# Patient Record
Sex: MALE | Race: WHITE | HISPANIC OR LATINO | Employment: OTHER | URBAN - METROPOLITAN AREA
[De-identification: names, ages, dates, MRNs, and addresses within clinical notes are randomized per-mention and may not be internally consistent; named-entity substitution may affect disease eponyms.]

---

## 2017-03-07 ENCOUNTER — HISTORICAL (OUTPATIENT)
Dept: RADIOLOGY | Facility: HOSPITAL | Age: 72
End: 2017-03-07

## 2017-10-18 ENCOUNTER — HISTORICAL (OUTPATIENT)
Dept: LAB | Facility: HOSPITAL | Age: 72
End: 2017-10-18

## 2018-06-11 ENCOUNTER — HISTORICAL (OUTPATIENT)
Dept: ADMINISTRATIVE | Facility: HOSPITAL | Age: 73
End: 2018-06-11

## 2018-06-13 LAB
FINAL CULTURE: NORMAL
RAPID GROUP A STREP (OHS): NEGATIVE

## 2019-04-02 ENCOUNTER — HISTORICAL (OUTPATIENT)
Dept: LAB | Facility: HOSPITAL | Age: 74
End: 2019-04-02

## 2019-04-02 LAB — DEPRECATED CALCIDIOL+CALCIFEROL SERPL-MC: 37.41 NG/ML (ref 30–80)

## 2020-12-14 ENCOUNTER — HISTORICAL (OUTPATIENT)
Dept: ADMINISTRATIVE | Facility: HOSPITAL | Age: 75
End: 2020-12-14

## 2022-04-09 ENCOUNTER — HISTORICAL (OUTPATIENT)
Dept: ADMINISTRATIVE | Facility: HOSPITAL | Age: 77
End: 2022-04-09

## 2022-04-26 VITALS
DIASTOLIC BLOOD PRESSURE: 83 MMHG | HEIGHT: 68 IN | WEIGHT: 153 LBS | SYSTOLIC BLOOD PRESSURE: 134 MMHG | BODY MASS INDEX: 23.19 KG/M2

## 2022-12-22 DIAGNOSIS — M48.02 CERVICAL STENOSIS OF SPINE: Primary | ICD-10-CM

## 2023-01-03 ENCOUNTER — TELEPHONE (OUTPATIENT)
Dept: NEUROSURGERY | Facility: CLINIC | Age: 78
End: 2023-01-03
Payer: MEDICARE

## 2023-01-03 NOTE — TELEPHONE ENCOUNTER
----- Message from Joycelyn Willard MA sent at 12/30/2022 12:13 PM CST -----  Regarding: call Emma Preciado NP  Call 's office and see if they have a recent MRI

## 2023-01-03 NOTE — TELEPHONE ENCOUNTER
Tried to call referring doctors office for recent MRI. I had to leave a message for Emma rascon. I did leave her our fax number to fax over to proceed with referral

## 2023-01-11 ENCOUNTER — TELEPHONE (OUTPATIENT)
Dept: NEUROSURGERY | Facility: CLINIC | Age: 78
End: 2023-01-11
Payer: MEDICARE

## 2023-01-11 DIAGNOSIS — M48.02 CERVICAL STENOSIS OF SPINE: Primary | ICD-10-CM

## 2023-01-13 ENCOUNTER — TELEPHONE (OUTPATIENT)
Dept: NEUROSURGERY | Facility: CLINIC | Age: 78
End: 2023-01-13
Payer: MEDICARE

## 2023-01-17 ENCOUNTER — TELEPHONE (OUTPATIENT)
Dept: NEUROSURGERY | Facility: CLINIC | Age: 78
End: 2023-01-17
Payer: MEDICARE

## 2023-01-17 NOTE — TELEPHONE ENCOUNTER
Called patient and scheduled with Clare on 2/13/23 at 11:00. Xray is scheduled at [facility] for 2/13/23 at 10:00. Patient is aware of this and verbalized understanding. Patient has not seen any other doctors for this reason except for Dr. Florez (office note in chart). He has had injections with Dr. Florez (also in chart). Has not had any recent testing besides MRI. States he has had neck sx with Dr. Stevens in 2015. I emailed him the consent form so I can get those records prior to apt.

## 2023-03-21 ENCOUNTER — OFFICE VISIT (OUTPATIENT)
Dept: NEUROSURGERY | Facility: CLINIC | Age: 78
End: 2023-03-21
Payer: MEDICARE

## 2023-03-21 VITALS
RESPIRATION RATE: 18 BRPM | HEART RATE: 55 BPM | HEIGHT: 68 IN | DIASTOLIC BLOOD PRESSURE: 78 MMHG | WEIGHT: 151 LBS | BODY MASS INDEX: 22.88 KG/M2 | SYSTOLIC BLOOD PRESSURE: 125 MMHG

## 2023-03-21 DIAGNOSIS — M54.9 DORSALGIA, UNSPECIFIED: ICD-10-CM

## 2023-03-21 DIAGNOSIS — M54.41 BILATERAL LOW BACK PAIN WITH RIGHT-SIDED SCIATICA, UNSPECIFIED CHRONICITY: Primary | ICD-10-CM

## 2023-03-21 DIAGNOSIS — M48.02 CERVICAL STENOSIS OF SPINE: ICD-10-CM

## 2023-03-21 PROCEDURE — 99214 PR OFFICE/OUTPT VISIT, EST, LEVL IV, 30-39 MIN: ICD-10-PCS | Mod: ,,, | Performed by: PHYSICIAN ASSISTANT

## 2023-03-21 PROCEDURE — 99214 OFFICE O/P EST MOD 30 MIN: CPT | Mod: ,,, | Performed by: PHYSICIAN ASSISTANT

## 2023-03-21 RX ORDER — LATANOPROST 50 UG/ML
SOLUTION/ DROPS OPHTHALMIC DAILY
COMMUNITY

## 2023-03-21 RX ORDER — GABAPENTIN 300 MG/1
300 CAPSULE ORAL 2 TIMES DAILY
COMMUNITY
Start: 2023-02-27 | End: 2023-06-07

## 2023-03-21 RX ORDER — VIT C/E/ZN/COPPR/LUTEIN/ZEAXAN 250MG-90MG
CAPSULE ORAL DAILY
COMMUNITY

## 2023-03-21 NOTE — PROGRESS NOTES
[facility]  History & Physical  Neurosurgery      Uche Nowak Jr   [id number]   1945       CHIEF COMPLAINT:  Lower thoracic and lower back pain.    HPI:  Uche Nowak Jr is a 77 y.o. male who presents for neurosurgical evaluation.  The patient complains of lower thoracic pain as well as lumbar pain.  He has pain that also radiates into the right buttock.  He has a long history of lower back pain.  The pain is worse when he is lying.  The pain interrupts his sleep.  Upon waking, it takes about 5 minutes to be able to straighten up due to the lower back pain.  Once he is up and about, he does not have pain.  In addition, prolonged standing causes an increase in lower back pain.  Denies numbness or tingling in either lower extremity.  Subjectively, he does not feel as though he has weakness in either lower extremity.  The back pain is not stopping him from doing anything he desires.    He has been seen in the past by Dr. Donaldson and myself for cervical stenosis.  He was last seen on 03/31/2022.  Although he has significant stenosis at C3-4 and C4-5, he did not have signs or symptoms of myelopathy.  He did have neck pain with pain radiating into the left trapezius muscle.  That pain has resolved since starting gabapentin.  This was prescribed by Dr. Denice Marrufo.  He does not have difficulty with dexterity.  He does feel the left arm is weaker than the right lifting weights overhead.  He does not have difficulties with his balance.  He was able to go skiing last week.  He denies disturbances in bowel or bladder function.      Past Medical History:   Diagnosis Date    Ankylosing spondylitis of unspecified sites in spine     Prostate cancer     Psoriasis     Rheumatoid arthritis, unspecified     Spinal stenosis of cervical region        Past Surgical History:   Procedure Laterality Date    CATARACT EXTRACTION Right 12/14/2020    Right C4-5, left C6-7 foraminotomies  07/15/2014    Dr. Orozco  "   ROTATOR CUFF REPAIR      VASECTOMY  02/02/1991       Family History   Problem Relation Age of Onset    Cancer Mother     Aortic aneurysm Father     Heart disease Brother        Social History     Socioeconomic History    Marital status: Unknown   Tobacco Use    Smoking status: Never    Smokeless tobacco: Never   Substance and Sexual Activity    Alcohol use: Yes     Comment: occaional    Drug use: Never        Review of patient's allergies indicates:   Allergen Reactions    Codeine Rash and Swelling    Pseudoephedrine Rash and Swelling    Acitretin Itching     Other reaction(s): arthralgia, Arthralgia (Joint Pain), hair loss    Cortisone         Medication List with Changes/Refills   Current Medications    CALCIUM CARBONATE (CALCIUM 500 ORAL)    Take by mouth once daily.    CHOLECALCIFEROL, VITAMIN D3, (VITAMIN D3) 25 MCG (1,000 UNIT) CAPSULE    once daily.    GABAPENTIN (NEURONTIN) 300 MG CAPSULE    Take 300 mg by mouth 2 (two) times daily.    LATANOPROST 0.005 % OPHTHALMIC SOLUTION    once daily.        Review of Systems   Constitutional:  Negative for chills and fever.   HENT:  Negative for congestion and hearing loss.    Eyes:  Negative for blurred vision and double vision.   Respiratory:  Negative for cough and wheezing.    Cardiovascular:  Negative for chest pain and palpitations.   Gastrointestinal:  Negative for nausea and vomiting.   Genitourinary:  Negative for urgency.   Musculoskeletal:  Positive for back pain. Negative for falls.   Skin:  Negative for rash.   Neurological:  Positive for weakness. Negative for dizziness, tingling and headaches.   Psychiatric/Behavioral:  Negative for hallucinations.         Physical Examination:    Vital Signs:  /78 (BP Location: Other (Comment), Patient Position: Sitting)   Pulse (!) 55   Resp 18   Ht 5' 8" (1.727 m)   Wt 68.5 kg (151 lb)   BMI 22.96 kg/m²        General:  Pleasant. Well-nourished. Well-groomed.    Lungs:  Breathing is quiet, " nonlabored.    Musculoskeletal:  Cervical ROM: Mildly limited with extension, moderately limited with bilateral rotation.  Lumbar ROM:  Severely limited with extension mildly limited with flexion  Hip rotation is negative bilaterally.  Straight leg raise is negative bilaterally.    Neurological:    Oriented to Person, Place, Time   Muscle strength against resistance:  Strength  Deltoids Triceps Biceps Wrist Extension Wrist Flexion Intrinsics   Upper: R 5/5 5/5 5/5 5/5 5/5 5/5    L 5/5 5/5 5/5 5/5 5/5 5/5     Iliopsoas Quadriceps Knee  Flexion Tibialis  anterior Gastro- cnemius EHL   Lower: R 5/5 5/5 5/5 5/5 5/5 5/5    L 5/5 5/5 5/5 5/5 5/5 5/5   Sensation is intact to primary modalities in bilateral upper and lower extremities with the exception of being diminished along bilateral median and S1 distributions.  Reflexes:   Right Left   Triceps 2+ 2+   Biceps 0 0   Brachioradialis 2+ 2+   Patellar 3+ 3+   Achilles 2+ 2+   Horn's sign is negative bilaterally.  Toes are down going to Babinski.  There is no clonus at the ankles.  Gait: normal  He is able to walk on heels and toes without difficulty.  He has a little difficulty walking in tandem.      Imaging:  Cervical x-rays were obtained on 02/13/2023.  This study shows disc space narrowing at C3-4 and C6-7.  Alignment is normal.  There is no abnormal motion with flexion or extension.  MRI of the cervical spine was obtained on 01/07/2023.  At C3-4 and C4-5, there is disc/osteophyte complex along with uncinate and facet hypertrophy.  This causes severe central and bilateral foraminal stenosis at C3-4.  There is severe central and right foraminal with moderate left foraminal stenosis at C4-5.  At C5-6, there is mild central moderate left foraminal and severe right foraminal stenosis.  At C6-7, there is severe bilateral foraminal stenosis.  The stenosis at C3-4 and C4-5 has progressed compared to prior MRI from 07/17/2021.      ASSESSMENT/PLAN:     1. Bilateral low  back pain with right-sided sciatica, unspecified chronicity  X-Ray Lumbar Complete Including Flex And Ext    X-Ray Thoracic Spine AP Lateral      2. Dorsalgia, unspecified  MRI Lumbar Spine Without Contrast      3. Cervical stenosis of spine          It is possible that the lower back pain could be coming from the cervical stenosis.  However, we need to obtain thoracic and lumbar imaging.  He will return for follow-up with those studies.       A total of 27 minutes was spent face-to-face with the patient during this encounter.  Over half of that time was spent on counseling and coordination of care.  Additional time was used to reviewed the patient's chart including prior office notes, cervical MRI and x-ray imaging, compare with old cervical MRI, and work on office note.      Clare Meraz PA-C

## 2023-04-05 ENCOUNTER — OFFICE VISIT (OUTPATIENT)
Dept: NEUROSURGERY | Facility: CLINIC | Age: 78
End: 2023-04-05
Payer: MEDICARE

## 2023-04-05 VITALS
HEIGHT: 68 IN | BODY MASS INDEX: 22.88 KG/M2 | WEIGHT: 151 LBS | HEART RATE: 59 BPM | DIASTOLIC BLOOD PRESSURE: 73 MMHG | SYSTOLIC BLOOD PRESSURE: 122 MMHG | RESPIRATION RATE: 17 BRPM

## 2023-04-05 DIAGNOSIS — M54.41 BILATERAL LOW BACK PAIN WITH RIGHT-SIDED SCIATICA, UNSPECIFIED CHRONICITY: ICD-10-CM

## 2023-04-05 DIAGNOSIS — M51.36 LUMBAR DEGENERATIVE DISC DISEASE: Primary | ICD-10-CM

## 2023-04-05 PROCEDURE — 99215 PR OFFICE/OUTPT VISIT, EST, LEVL V, 40-54 MIN: ICD-10-PCS | Mod: ,,, | Performed by: PHYSICIAN ASSISTANT

## 2023-04-05 PROCEDURE — 99215 OFFICE O/P EST HI 40 MIN: CPT | Mod: ,,, | Performed by: PHYSICIAN ASSISTANT

## 2023-04-05 RX ORDER — CYCLOBENZAPRINE HCL 10 MG
10 TABLET ORAL 3 TIMES DAILY PRN
Qty: 30 TABLET | Refills: 0 | Status: SHIPPED | OUTPATIENT
Start: 2023-04-05 | End: 2023-04-15

## 2023-04-05 RX ORDER — CELECOXIB 100 MG/1
100 CAPSULE ORAL 2 TIMES DAILY PRN
COMMUNITY
Start: 2023-03-28 | End: 2023-06-07

## 2023-04-05 NOTE — PROGRESS NOTES
[facility]  History & Physical  Neurosurgery      Uche Nowak Jr   [id number]   1945       CHIEF COMPLAINT:  Lower back and right buttock    HPI:  Uche Nowak Jr is a 77 y.o. male who presents for follow up appointment with thoracic and lumbar x-rays and lumbar MRI.  He complains of low back pain with pain radiating to the right buttocks.  He was last seen on 03/21/2023.  Since that time, there has been an increase in pain in his right buttock.  A couple of weeks ago he was working on a fence.  He bent over and felt an exacerbation of pain into the right buttock.  The pain has been persistent.  He rates his pain at 4/10 at this time.      Past Medical History:   Diagnosis Date    Ankylosing spondylitis of unspecified sites in spine     Prostate cancer     Psoriasis     Rheumatoid arthritis, unspecified     Spinal stenosis of cervical region        Past Surgical History:   Procedure Laterality Date    CATARACT EXTRACTION Right 12/14/2020    Right C4-5, left C6-7 foraminotomies  07/15/2014    Dr. Orozco    ROTATOR CUFF REPAIR      VASECTOMY  02/02/1991       Family History   Problem Relation Age of Onset    Cancer Mother     Aortic aneurysm Father     Heart disease Brother        Social History     Socioeconomic History    Marital status: Unknown   Tobacco Use    Smoking status: Never    Smokeless tobacco: Never   Substance and Sexual Activity    Alcohol use: Yes     Comment: occaional    Drug use: Never       Current Outpatient Medications   Medication Sig Dispense Refill    calcium carbonate (CALCIUM 500 ORAL) Take by mouth once daily.      celecoxib (CELEBREX) 100 MG capsule Take 100 mg by mouth 2 (two) times daily as needed.      cholecalciferol, vitamin D3, (VITAMIN D3) 25 mcg (1,000 unit) capsule once daily.      gabapentin (NEURONTIN) 300 MG capsule Take 300 mg by mouth 2 (two) times daily.      latanoprost 0.005 % ophthalmic solution once daily.       No current  "facility-administered medications for this visit.       Review of patient's allergies indicates:   Allergen Reactions    Codeine Rash and Swelling    Pseudoephedrine Rash and Swelling    Acitretin Itching     Other reaction(s): arthralgia, Arthralgia (Joint Pain), hair loss    Cortisone         Medication List with Changes/Refills   Current Medications    CALCIUM CARBONATE (CALCIUM 500 ORAL)    Take by mouth once daily.    CELECOXIB (CELEBREX) 100 MG CAPSULE    Take 100 mg by mouth 2 (two) times daily as needed.    CHOLECALCIFEROL, VITAMIN D3, (VITAMIN D3) 25 MCG (1,000 UNIT) CAPSULE    once daily.    GABAPENTIN (NEURONTIN) 300 MG CAPSULE    Take 300 mg by mouth 2 (two) times daily.    LATANOPROST 0.005 % OPHTHALMIC SOLUTION    once daily.           ROS:    Review of Systems   Constitutional:  Negative for chills and fever.   HENT:  Negative for nosebleeds and sore throat.    Eyes:  Negative for pain and visual disturbance.   Respiratory:  Negative for cough, chest tightness and shortness of breath.    Cardiovascular:  Negative for chest pain.   Gastrointestinal:  Negative for diarrhea, nausea and vomiting.   Genitourinary:  Negative for difficulty urinating, dysuria and hematuria.   Musculoskeletal:  Positive for back pain and neck pain. Negative for gait problem and myalgias.   Skin:  Negative for rash.   Neurological:  Negative for dizziness, facial asymmetry, numbness and headaches.   Psychiatric/Behavioral:  Negative for confusion and sleep disturbance. The patient is not nervous/anxious.      Physical Examination:    Vital Signs:  /73 (BP Location: Other (Comment), Patient Position: Sitting)   Pulse (!) 59   Resp 17   Ht 5' 8" (1.727 m)   Wt 68.5 kg (151 lb)   BMI 22.96 kg/m²      General:  Pleasant. Well-nourished. Well-groomed.     Lungs:  Breathing is quiet, nonlabored.     Musculoskeletal:  Cervical ROM: Mildly limited with extension, moderately limited with bilateral rotation.  Lumbar ROM:  " Severely limited with extension mildly limited with flexion  Hip rotation is negative bilaterally.  Straight leg raise is negative bilaterally.     Neurological:    Oriented to Person, Place, Time   Muscle strength against resistance:  Strength   Deltoids Triceps Biceps Wrist Extension Wrist Flexion Intrinsics   Upper: R 5/5 5/5 5/5 5/5 5/5 5/5     L 5/5 5/5 5/5 5/5 5/5 5/5       Iliopsoas Quadriceps Knee  Flexion Tibialis  anterior Gastro- cnemius EHL   Lower: R 5/5 5/5 5/5 5/5 5/5 5/5     L 5/5 5/5 5/5 5/5 5/5 5/5   Sensation is intact to primary modalities in bilateral upper and lower extremities with the exception of being diminished along bilateral median and S1 distributions.  Reflexes:    Right Left   Triceps 2+ 2+   Biceps 0 0   Brachioradialis 2+ 2+   Patellar 3+ 3+   Achilles 2+ 2+   Horn's sign is negative bilaterally.  Toes are down going to Babinski.  There is no clonus at the ankles.  Gait: normal  He is able to walk on heels and toes without difficulty.  He has a little difficulty walking in tandem.      Imaging:  All pertinent neuroimaging independently reviewed. Discussed these findings in detail with the patient.    ASSESSMENT/PLAN:     1. Lumbar degenerative disc disease  Ambulatory referral/consult to Physical/Occupational Therapy    cyclobenzaprine (FLEXERIL) 10 MG tablet      2. Bilateral low back pain with right-sided sciatica, unspecified chronicity          Options were discussed at length with the patient.  He will undergo a course of physical therapy.  He was also provided with a prescription for Flexeril.  He will return for follow-up in 8 weeks.      A total of 18 minutes was spent face-to-face with the patient during this encounter.  Over half of that time was spent on counseling and coordination of care.  An additional 25 minutes were used to review the patients chart, thoracic and lumbar x-rays, lumbar MRI, and work on office note.      Clare Meraz PA-C

## 2023-06-07 ENCOUNTER — OFFICE VISIT (OUTPATIENT)
Dept: NEUROSURGERY | Facility: CLINIC | Age: 78
End: 2023-06-07
Payer: MEDICARE

## 2023-06-07 VITALS
BODY MASS INDEX: 22.22 KG/M2 | DIASTOLIC BLOOD PRESSURE: 79 MMHG | HEIGHT: 68 IN | SYSTOLIC BLOOD PRESSURE: 126 MMHG | RESPIRATION RATE: 16 BRPM | WEIGHT: 146.63 LBS | HEART RATE: 53 BPM

## 2023-06-07 DIAGNOSIS — M51.36 LUMBAR DEGENERATIVE DISC DISEASE: Primary | ICD-10-CM

## 2023-06-07 DIAGNOSIS — M51.34 DDD (DEGENERATIVE DISC DISEASE), THORACIC: ICD-10-CM

## 2023-06-07 PROCEDURE — 99213 OFFICE O/P EST LOW 20 MIN: CPT | Mod: ,,, | Performed by: PHYSICIAN ASSISTANT

## 2023-06-07 PROCEDURE — 99213 PR OFFICE/OUTPT VISIT, EST, LEVL III, 20-29 MIN: ICD-10-PCS | Mod: ,,, | Performed by: PHYSICIAN ASSISTANT

## 2023-06-07 RX ORDER — CYCLOBENZAPRINE HCL 10 MG
TABLET ORAL
COMMUNITY
End: 2023-06-07

## 2023-06-07 NOTE — PROGRESS NOTES
[facility]  History & Physical  Neurosurgery      Uche Nowak Jr   [id number]   1945       SUBJECTIVE:     CHIEF COMPLAINT:  Mid and lower back pain      HPI:  Uche Nowak Jr is a 77 y.o. male who presents for follow up appointment after a course of physical therapy.  He complains of intermittent mid back and lower back pain.  The mid back pain comes on mid day, depending on his activity.  It is especially increased with bending over as when he works in his yard.  He awakens with lower back pain.  It takes him several minutes before he can straighten due to the low back pain.  For both, some days are better than others.  He is able to participate in all activities he desires.  He has continue with his exercise regimen, 5 days per week.  He previously had neck pain with pain radiating to the left upper extremity.  That began 2 years ago.  He was placed on gabapentin by Dr. Eduardo.  That pain has resolved months ago.  He has been able to taper off of the gabapentin without recurrence of pain.      Past Medical History:   Diagnosis Date    Ankylosing spondylitis of unspecified sites in spine     Prostate cancer     Psoriasis     Rheumatoid arthritis, unspecified     Spinal stenosis of cervical region        Past Surgical History:   Procedure Laterality Date    CATARACT EXTRACTION Right 12/14/2020    Right C4-5, left C6-7 foraminotomies  07/15/2014    Dr. Orozco    ROTATOR CUFF REPAIR      VASECTOMY  02/02/1991       Family History   Problem Relation Age of Onset    Cancer Mother     Aortic aneurysm Father     Heart disease Brother        Social History     Socioeconomic History    Marital status: Unknown   Tobacco Use    Smoking status: Never    Smokeless tobacco: Never   Substance and Sexual Activity    Alcohol use: Yes     Comment: occaional    Drug use: Never       Review of patient's allergies indicates:   Allergen Reactions    Codeine Rash and Swelling    Pseudoephedrine Rash and  "Swelling    Acitretin Itching     Other reaction(s): arthralgia, Arthralgia (Joint Pain), hair loss        Current Outpatient Medications   Medication Instructions    calcium carbonate (CALCIUM 500 ORAL) Oral, Daily    cholecalciferol, vitamin D3, (VITAMIN D3) 25 mcg (1,000 unit) capsule Daily    latanoprost 0.005 % ophthalmic solution Daily           ROS:    Review of Systems   Constitutional:  Negative for chills and fever.   HENT:  Negative for nosebleeds and sore throat.    Eyes:  Negative for pain and visual disturbance.   Respiratory:  Negative for cough, chest tightness and shortness of breath.    Cardiovascular:  Negative for chest pain.   Gastrointestinal:  Negative for diarrhea, nausea and vomiting.   Genitourinary:  Negative for difficulty urinating, dysuria and hematuria.   Musculoskeletal:  Positive for back pain. Negative for gait problem and myalgias.   Skin:  Negative for rash.   Neurological:  Negative for dizziness, facial asymmetry, weakness and headaches.   Psychiatric/Behavioral:  Negative for confusion and sleep disturbance. The patient is not nervous/anxious.        OBJECTIVE:     Physical Examination:      Visit Vitals  /79   Pulse (!) 53   Resp 16   Ht 5' 8" (1.727 m)   Wt 66.5 kg (146 lb 9.6 oz)   BMI 22.29 kg/m²        General:  Pleasant, Well-nourished, Well-groomed.    Lungs:  Breathing is quiet with non-labored respirations.    Neurological:  The patient is awake, alert, and oriented in all 4 spheres.  His memory, cognition, and affect are appropriate.  Speech is fluent.  He is moving all extremities well.  Gait is normal.  Coordination:  Within normal limits    Imaging:  All pertinent neuroimaging independently reviewed. Discussed these findings in detail with the patient.    ASSESSMENT:     1. Lumbar degenerative disc disease        2. DDD (degenerative disc disease), thoracic           PLAN:     Options were discussed with the patient.  We discussed the importance of proper " body mechanics as well as core strengthening exercises.  He voiced understanding.  He was provided with a handout regarding stretching and strengthening exercises for the mid and lower back.  He will incorporate this into his exercise program.  He will return for follow-up on an as-needed basis.      A total of 10 minutes was spent face-to-face with the patient during this encounter.  Over half of that time was spent on counseling and coordination of care.  An additional 10+ minutes were used to reviewed the patient's chart, lumbar MRI images, lumbar and thoracic x-rays images, and work on office note.      Clare Meraz PA-C

## 2023-12-11 ENCOUNTER — TELEPHONE (OUTPATIENT)
Dept: NEUROSURGERY | Facility: CLINIC | Age: 78
End: 2023-12-11
Payer: MEDICARE

## 2023-12-12 NOTE — TELEPHONE ENCOUNTER
Please schedule the patient sooner rather than later on either my or Clare's schedule with AP/Lat/Flex/Ext views of the lumbar spine. Thanks

## 2023-12-13 NOTE — TELEPHONE ENCOUNTER
Spoke with patient and scheduled him with Kellen for 12/21 at 2:00 w/ XR prior at [facility] for 1:00pm.

## 2023-12-19 NOTE — PROGRESS NOTES
[facility]  History & Physical  Neurosurgery      Uche Nowak Jr   [id number]   1945       SUBJECTIVE:     CHIEF COMPLAINT:    Lower back pain    HPI:  Uche Nowak Jr is a 78 y.o. male who presents for neurosurgical evaluation. The patient was last seen in clinic by Clare on 6/7/2023 describing mid and lower back pain that was intermittently present.  He reported his symptoms were made worse with bending forward such as with yard work.  He  reported he would often wake with lower back pain although this would improve once he was up and moving.  He was exercising 5 days a week at that time.  He also previously had neck pain radiating into the left upper extremity.  He was provided gabapentin per Dr. Eduardo although was no longer requiring this medication at that time secondary to improvement of symptoms.    The patient notified our office on 12/12/2023 of a fall on 12/1/23 which resulted in some shoulder pain as well as severe lower back pain. He was seen at [facility] on 12/11/23 and provided a right shoulder injection.  He reports his lower back pain and shoulder pain have now resolved.  He states his  mid back pain  has returned to baseline.  He states he does continue to have intermittent axial mid back pain throughout the day which does respond to Advil.  He states his symptoms are manageable at this time.  He is denying any changes in bowel or bladder function.  He is denying any issues with balance.  He is denying any numbness weakness or radicular symptoms at this time.  He continues to be monitored for rheumatoid arthritis and ankylosing spondylitis per Dr. Wiggins.    Past Medical History:   Diagnosis Date    Ankylosing spondylitis of unspecified sites in spine     Bilateral low back pain with right-sided sciatica, unspecified chronicity     Lumbar degenerative disc disease     Prostate cancer     Psoriasis     Rheumatoid arthritis, unspecified     Spinal stenosis of cervical  region        Past Surgical History:   Procedure Laterality Date    CATARACT EXTRACTION Right 12/14/2020    Right C4-5, left C6-7 foraminotomies  07/15/2014    Dr. Orozco    ROTATOR CUFF REPAIR      VASECTOMY  02/02/1991       Family History   Problem Relation Age of Onset    Cancer Mother     Aortic aneurysm Father     Heart disease Brother        Social History     Socioeconomic History    Marital status: Unknown   Tobacco Use    Smoking status: Never    Smokeless tobacco: Never   Substance and Sexual Activity    Alcohol use: Yes     Comment: occaional    Drug use: Never        Review of patient's allergies indicates:   Allergen Reactions    Codeine Rash and Swelling    Pseudoephedrine Rash and Swelling    Acitretin Itching     Other reaction(s): arthralgia, Arthralgia (Joint Pain), hair loss        Current Outpatient Medications   Medication Instructions    calcium carbonate (CALCIUM 500 ORAL) Oral, Daily    celecoxib (CELEBREX) 100 mg, Oral, 2 times daily PRN    cholecalciferol, vitamin D3, (VITAMIN D3) 25 mcg (1,000 unit) capsule Daily    CIMZIA 400 mg, Subcutaneous, Every 30 days    HYDROcodone-acetaminophen (NORCO) 5-325 mg per tablet 1 tablet, Oral, Every 8 hours    latanoprost 0.005 % ophthalmic solution Daily    traZODone (DESYREL) 50 mg, Oral, Nightly          Review of Systems   Constitutional:  Negative for chills, fever and weight loss.   HENT:  Negative for congestion, hearing loss, nosebleeds and tinnitus.    Eyes:  Negative for blurred vision, double vision and photophobia.   Respiratory:  Negative for cough, shortness of breath and wheezing.    Cardiovascular:  Negative for chest pain, palpitations and leg swelling.   Gastrointestinal:  Negative for constipation, diarrhea, nausea and vomiting.   Genitourinary:  Negative for dysuria, frequency and urgency.   Musculoskeletal:  Positive for back pain (axial mid back pain). Negative for falls and neck pain.   Skin:  Negative for itching and rash.  "  Neurological:  Negative for dizziness, tingling, tremors, sensory change, speech change, seizures, loss of consciousness and headaches.   Psychiatric/Behavioral:  Negative for depression, hallucinations and memory loss. The patient is not nervous/anxious.        OBJECTIVE:     Visit Vitals  /72 (BP Location: Left arm, Patient Position: Sitting)   Pulse 60   Resp 16   Ht 5' 8" (1.727 m)   Wt 66.2 kg (146 lb)   BMI 22.20 kg/m²        Physical Exam    General:  Pleasant, Well-nourished, Well-groomed.    Cardiovascular:  Neck is supple.  There are no carotid bruits.  Heart has regular rate and rhythm.    Lungs:  Breathing is quiet, non-lablored    Abdomen:  Soft, non-tender, non-distended.    Neurological:  Muscle strength against resistance:   Right Left   Deltoid (C5) 5/5 5/5   Biceps (C5/6) 5/5 5/5   Wrist Flexors (C5/6) 5/5 5/5   Triceps (C7) 5/5 5/5   Wrist extension (C7) 5/5 5/5   Finger abduction (C8) 5/5 5/5    5/5 5/5        Hip abduction 5/5 5/5   Hip adduction 5/5 5/5   Hip flexion (L2) 5/5 5/5   Knee extension (L3) 5/5 5/5   Knee flexion (L4) 5/5 5/5   Dorsiflexion (L5) 5/5 5/5   EHL (L5) 5/5 5/5   Plantar flexion (S1) 5/5 5/5   Sensation is intact to primary modalities in bilateral upper and lower extremities.    Reflexes:   Right Left   Triceps (C7) 2+ 2+   Biceps (C5) 2+ 2+   Brachioradialis (C6) 2+ 2+   Patellar (L4) 2+ 2+   Achilles (S1) 2+ 2+   Negative Babinski, Clonus, Horn, Tinel's, and Phalen's bilaterally.  Gait is normal  Coordination is normal.  No tremor noted.    Imaging:  All pertinent neuroimaging independently reviewed. Discussed these findings in detail with the patient.     Updated x-rays of the lumbar spine reveal similar findings in comparison to previous imaging dated 3/30/2023.    ASSESSMENT:       ICD-10-CM ICD-9-CM   1. Lumbar degenerative disc disease  M51.36 722.52   2. Dorsalgia, unspecified  M54.9 724.5       PLAN:     1. Lumbar degenerative disc disease    2. " Dorsalgia, unspecified    Uche Nowak Jr presents today  Reporting a recent fall which did aggravate his mid back and lower back pain.  Thankfully his symptoms have returned to baseline at this time.  The patient was encouraged to continue on with his home exercise program.  He was encouraged to attempt Celebrex as previously prescribed by Dr. Wiggins.  We will plan to see the patient back in clinic on an as-needed basis going forward.  He was advised to notify us with any regression in his symptoms in the future.    Follow up if symptoms worsen or fail to improve.      E/M Level Based On Time:   15 minutes spent on reviewing chart, which includes interpreting lab results and diagnostic tests.   10 minutes spent in the room with the patient performing a history and physical exam, counseling or educating the patient/caregiver, prescribing medications, ordering labwork/diagnostic tests, or placing referrals.   0 minutes spent collaborating plan of care with physician.   5 minutes spent documenting all relevant clinical informationin the electronic health record.     Total Time Spent: 30 minutes       ARIADNE Hamm    Disclaimer:  This note is prepared using voice recognition software and as such is likely to have errors despite attempts at proofreading. Please contact me for questions.

## 2023-12-21 ENCOUNTER — HOSPITAL ENCOUNTER (OUTPATIENT)
Dept: RADIOLOGY | Facility: HOSPITAL | Age: 78
Discharge: HOME | End: 2023-12-21
Attending: PHYSICIAN ASSISTANT
Payer: MEDICARE

## 2023-12-21 ENCOUNTER — OFFICE VISIT (OUTPATIENT)
Dept: NEUROSURGERY | Facility: CLINIC | Age: 78
End: 2023-12-21
Payer: MEDICARE

## 2023-12-21 VITALS
BODY MASS INDEX: 22.13 KG/M2 | HEART RATE: 60 BPM | SYSTOLIC BLOOD PRESSURE: 128 MMHG | WEIGHT: 146 LBS | DIASTOLIC BLOOD PRESSURE: 72 MMHG | RESPIRATION RATE: 16 BRPM | HEIGHT: 68 IN

## 2023-12-21 DIAGNOSIS — M54.9 DORSALGIA, UNSPECIFIED: ICD-10-CM

## 2023-12-21 DIAGNOSIS — M51.36 LUMBAR DEGENERATIVE DISC DISEASE: Primary | ICD-10-CM

## 2023-12-21 PROCEDURE — 72114 X-RAY EXAM L-S SPINE BENDING: CPT | Mod: TC

## 2023-12-21 PROCEDURE — 99213 OFFICE O/P EST LOW 20 MIN: CPT | Mod: ,,, | Performed by: NURSE PRACTITIONER

## 2023-12-21 PROCEDURE — 99213 PR OFFICE/OUTPT VISIT, EST, LEVL III, 20-29 MIN: ICD-10-PCS | Mod: ,,, | Performed by: NURSE PRACTITIONER

## 2023-12-21 RX ORDER — HYDROCODONE BITARTRATE AND ACETAMINOPHEN 5; 325 MG/1; MG/1
1 TABLET ORAL EVERY 8 HOURS
COMMUNITY
Start: 2023-12-11 | End: 2023-12-21

## 2023-12-21 RX ORDER — CELECOXIB 100 MG/1
100 CAPSULE ORAL 2 TIMES DAILY PRN
COMMUNITY
Start: 2023-12-18

## 2023-12-21 RX ORDER — CERTOLIZUMAB PEGOL 200 MG/ML
400 INJECTION, SOLUTION SUBCUTANEOUS
COMMUNITY

## 2023-12-21 RX ORDER — TRAZODONE HYDROCHLORIDE 50 MG/1
50 TABLET ORAL NIGHTLY
COMMUNITY
Start: 2023-08-02 | End: 2023-12-21